# Patient Record
Sex: MALE | Race: WHITE
[De-identification: names, ages, dates, MRNs, and addresses within clinical notes are randomized per-mention and may not be internally consistent; named-entity substitution may affect disease eponyms.]

---

## 2023-09-02 ENCOUNTER — HOSPITAL ENCOUNTER (OUTPATIENT)
Dept: HOSPITAL 47 - RADXRMAIN | Age: 33
Discharge: HOME | End: 2023-09-02
Attending: ORTHOPAEDIC SURGERY
Payer: COMMERCIAL

## 2023-09-02 DIAGNOSIS — Z18.10: Primary | ICD-10-CM

## 2023-09-02 NOTE — XR
EXAMINATION TYPE: XR femur LT

 

DATE OF EXAM: 9/2/2023 10:34 AM

 

INDICATION: 

Patient age:Male;  33 years old; 

Reason for study: Z18.10 Retained Metal Fragments,Unspec; 

 

COMPARISON: None

 

TECHNIQUE: The left femur was examined in AP and lateral projections.

 

FINDINGS:  No evidence of acute osseous pathology, joint dislocation, or soft tissue swelling. Intram
edullary марина identified within the visualized proximal tibia. No other radiopaque foreign bodies iden
tified.

 

IMPRESSION: 

1.  No acute osseous pathology.

2.  Intramedullary марина within the proximal tibia. No other radiopaque foreign bodies identified.

## 2023-09-06 ENCOUNTER — HOSPITAL ENCOUNTER (OUTPATIENT)
Dept: HOSPITAL 47 - RADMRIMAIN | Age: 33
Discharge: HOME | End: 2023-09-06
Attending: ORTHOPAEDIC SURGERY
Payer: COMMERCIAL

## 2023-09-06 DIAGNOSIS — M17.11: Primary | ICD-10-CM

## 2023-09-06 DIAGNOSIS — Z18.10: ICD-10-CM

## 2023-09-09 NOTE — MR
EXAMINATION TYPE: MR knee RT wo con

 

DATE OF EXAM: 9/6/2023

 

COMPARISON: None

 

HISTORY: Right knee pain, hx марина placed in femur.

 

TECHNIQUE: Multiplanar, multisequence imaging of the right knee is performed without IV contrast.

 

FINDINGS:

 

Exam is mildly to moderately limited by the patient's body habitus.

 

There is an intramedullary марина within the distal femur. There are no acute fractures or focal edema. 
There is no joint effusion.

 

The cruciate and collateral ligaments are intact.

 

There is mild to moderate osteoarthritic change of the medial lateral compartment where there is mild
 to moderate cartilaginous thinning and hypertrophic spurring. There is moderate to severe osteoarthr
itic change in the patellofemoral compartment where there is moderate to severe cartilaginous thinnin
g and mild subchondral bone changes.

 

Evaluation of the menisci is limited. Tear involving the superior surface of the posterior horn of th
e lateral meniscus is suspected. The medial meniscus is intact.

 

IMPRESSION:

1. Tricompartment osteoarthritis as described above greatest in the patellofemoral compartment.

2. Suspect tear of the posterior horn of the lateral meniscus.

3. No ligamentous injury.

4. Intramedullary марина within the distal femur.

5. No joint effusion.

6. No acute fracture or bone marrow edema.